# Patient Record
Sex: MALE | Race: WHITE | Employment: FULL TIME | URBAN - NONMETROPOLITAN AREA
[De-identification: names, ages, dates, MRNs, and addresses within clinical notes are randomized per-mention and may not be internally consistent; named-entity substitution may affect disease eponyms.]

---

## 2018-02-16 ENCOUNTER — HOSPITAL ENCOUNTER (OUTPATIENT)
Age: 48
Discharge: HOME | End: 2018-02-16
Payer: COMMERCIAL

## 2018-02-16 ENCOUNTER — HOSPITAL ENCOUNTER (OUTPATIENT)
Dept: NON INVASIVE DIAGNOSTICS | Age: 48
Discharge: HOME | End: 2018-02-16
Payer: COMMERCIAL

## 2018-02-16 LAB
EKG ATRIAL RATE: 101 BPM
EKG P AXIS: 76 DEGREES
EKG P-R INTERVAL: 150 MS
EKG Q-T INTERVAL: 372 MS
EKG QRS DURATION: 110 MS
EKG QTC CALCULATION (BAZETT): 482 MS
EKG R AXIS: 52 DEGREES
EKG T AXIS: 54 DEGREES
EKG VENTRICULAR RATE: 101 BPM

## 2018-02-16 PROCEDURE — 93225 XTRNL ECG REC<48 HRS REC: CPT

## 2018-02-16 PROCEDURE — 93005 ELECTROCARDIOGRAM TRACING: CPT

## 2018-03-19 ENCOUNTER — OFFICE VISIT (OUTPATIENT)
Dept: CARDIOLOGY | Age: 48
End: 2018-03-19
Payer: COMMERCIAL

## 2018-03-19 VITALS
SYSTOLIC BLOOD PRESSURE: 166 MMHG | WEIGHT: 214 LBS | OXYGEN SATURATION: 98 % | RESPIRATION RATE: 16 BRPM | HEART RATE: 96 BPM | BODY MASS INDEX: 28.99 KG/M2 | DIASTOLIC BLOOD PRESSURE: 91 MMHG | HEIGHT: 72 IN

## 2018-03-19 DIAGNOSIS — I49.3 FREQUENT PVCS: Primary | ICD-10-CM

## 2018-03-19 DIAGNOSIS — I10 ESSENTIAL HYPERTENSION: ICD-10-CM

## 2018-03-19 PROCEDURE — 99243 OFF/OP CNSLTJ NEW/EST LOW 30: CPT | Performed by: FAMILY MEDICINE

## 2018-03-19 RX ORDER — OMEPRAZOLE 20 MG/1
20 CAPSULE, DELAYED RELEASE ORAL EVERY OTHER DAY
COMMUNITY
Start: 2018-03-16

## 2018-03-19 RX ORDER — DILTIAZEM HYDROCHLORIDE 120 MG/1
120 CAPSULE, COATED, EXTENDED RELEASE ORAL DAILY
Qty: 90 CAPSULE | Refills: 3 | Status: SHIPPED | OUTPATIENT
Start: 2018-03-19 | End: 2019-02-04 | Stop reason: SDUPTHER

## 2018-03-19 RX ORDER — ALPRAZOLAM 0.25 MG/1
0.25 TABLET ORAL PRN
COMMUNITY
Start: 2018-02-16

## 2018-03-19 RX ORDER — AMLODIPINE BESYLATE 5 MG/1
TABLET ORAL
COMMUNITY
Start: 2018-03-16 | End: 2018-03-19 | Stop reason: ALTCHOICE

## 2018-03-19 RX ORDER — AMOXICILLIN 500 MG
2 CAPSULE ORAL DAILY
COMMUNITY
End: 2019-03-13

## 2018-03-19 NOTE — PROGRESS NOTES
Mihaela Gutierrez CMA am scribing for and in the presence of Dr. Robel Cramer    Patient: Johny Salcido  : 1970  Date of Visit: 2018    REASON FOR VISIT / CONSULTATION: New Patient (Hx: PVC's (06024)LA holter monitor. EKG had been done at the hospital as well. Pt states that he had taken a Zpak and after 3 days into antibiotic he was having palpitations x 6 wks ago(which lasted about 2 hours), he had stopped taking Zpak, but noticed another epiosdes of palpitations about 5 days later (which lasted about an hour). He says the more he thinks about palpitations he think he brings them on his self. Pt states that he exercises on his treadmill every night (30 mins). Denies CP, SOB. ) and Results (PVC's (55643) and isolated PVC's 53897 and 535 ventricular couplets. 9.7% of test duration. Tachycardia 30% of the time. )      Dear Dr. Rohit Kelley,    I had the pleasure of seeing Johny Salcido today. Mr. Deo Alexander is a 50 y.o. male  with a history of an abnormal Holter monitor that had shown frequent PVC's of 12,888 with isolated PVC's of 11,818 and 535 ventricular couplets comprising of 9.7%. His family history consist of his father having a stroke at 58years of age but denies any other family history. He denied any current or recent chest pain, abdominal pain, bleeding problems, problems with his medications or any other concerns at this time. · Palpitation symptoms: Occurring about 1x/month; of moderate intesity; Associated symptoms: none; Exacerbating factors: stress/anxiety; Remediating factors: calming self-down. · Exercise Tolerance: He reports that he has a good exercise tolerance. Mr. Deo Alexander says that he could walk a mile without developing significant chest pain and/or shortness of breath. Past Medical History:   Diagnosis Date    History of Holter monitoring 2018    Very frequent PVC's comprsing 9.7% of total duration. Tachycardia for 30% of the total test duration.  Symproms associated with PVC's and sinus tachycardia. CURRENT ALLERGIES: Patient has no known allergies. REVIEW OF SYSTEMS: 14 systems were reviewed. Pertinent positives and negatives as above, all else negative. No past surgical history on file. Social History:  Social History   Substance Use Topics    Smoking status: Never Smoker    Smokeless tobacco: Never Used    Alcohol use 7.2 oz/week     12 Cans of beer per week        CURRENT MEDICATIONS:  Outpatient Prescriptions Marked as Taking for the 3/19/18 encounter (Office Visit) with Atul Peter MD   Medication Sig Dispense Refill    ALPRAZolam (XANAX) 0.25 MG tablet       amLODIPine (NORVASC) 5 MG tablet       omeprazole (PRILOSEC) 20 MG delayed release capsule       Omega-3 Fatty Acids (FISH OIL) 1200 MG CAPS Take 2 capsules by mouth daily      aspirin 81 MG tablet Take 81 mg by mouth daily      Psyllium (METAMUCIL PO) Take by mouth      Inulin (FIBER CHOICE PO) Take by mouth         FAMILY HISTORY: family history includes Other in his brother; Stroke (age of onset: 58) in his father. PHYSICAL EXAM:   BP (!) 166/97 (Site: Left Arm, Position: Sitting, Cuff Size: Large Adult)   Pulse 94   Resp 16   Ht 6' (1.829 m)   Wt 214 lb (97.1 kg)   SpO2 98%   BMI 29.02 kg/m²  Body mass index is 29.02 kg/m². Constitutional: He is oriented to person, place, and time. He appears well-developed and well-nourished. In no acute distress. HEENT: Normocephalic and atraumatic. No JVD present. Carotid bruit is not present. No mass and no thyromegaly present. No lymphadenopathy present. Cardiovascular: Normal rate, irregularly irregular rhythm, normal heart sounds. Exam reveals no gallop and no friction rubs. No heart murmur heard. Pulmonary/Chest: Effort normal and breath sounds normal. No respiratory distress. He has no wheezes, rhonchi or rales. Abdominal: Soft, non-tender. Bowel sounds and aorta are normal. He exhibits no organomegaly, mass or bruit.

## 2018-03-29 ENCOUNTER — HOSPITAL ENCOUNTER (OUTPATIENT)
Dept: NON INVASIVE DIAGNOSTICS | Age: 48
Discharge: HOME | End: 2018-03-29
Payer: COMMERCIAL

## 2018-03-29 DIAGNOSIS — I10 ESSENTIAL HYPERTENSION: ICD-10-CM

## 2018-03-29 DIAGNOSIS — I49.3 FREQUENT PVCS: ICD-10-CM

## 2018-03-29 LAB
LV EF: 60 %
LVEF MODALITY: NORMAL

## 2018-03-29 PROCEDURE — 93017 CV STRESS TEST TRACING ONLY: CPT

## 2018-03-29 PROCEDURE — 93306 TTE W/DOPPLER COMPLETE: CPT

## 2018-04-02 ENCOUNTER — TELEPHONE (OUTPATIENT)
Dept: CARDIOLOGY | Age: 48
End: 2018-04-02

## 2018-04-17 ENCOUNTER — HOSPITAL ENCOUNTER (OUTPATIENT)
Dept: NON INVASIVE DIAGNOSTICS | Age: 48
Discharge: HOME | End: 2018-04-17
Payer: COMMERCIAL

## 2018-04-17 DIAGNOSIS — I10 ESSENTIAL HYPERTENSION: ICD-10-CM

## 2018-04-17 DIAGNOSIS — I49.3 FREQUENT PVCS: ICD-10-CM

## 2018-04-17 PROCEDURE — 93225 XTRNL ECG REC<48 HRS REC: CPT

## 2018-04-25 ENCOUNTER — TELEPHONE (OUTPATIENT)
Dept: CARDIOLOGY | Age: 48
End: 2018-04-25

## 2019-02-04 RX ORDER — DILTIAZEM HYDROCHLORIDE 120 MG/1
CAPSULE, EXTENDED RELEASE ORAL
Qty: 90 CAPSULE | Refills: 3 | Status: SHIPPED | OUTPATIENT
Start: 2019-02-04 | End: 2019-03-13 | Stop reason: DRUGHIGH

## 2019-03-13 ENCOUNTER — OFFICE VISIT (OUTPATIENT)
Dept: CARDIOLOGY | Age: 49
End: 2019-03-13
Payer: COMMERCIAL

## 2019-03-13 VITALS — HEART RATE: 89 BPM | SYSTOLIC BLOOD PRESSURE: 172 MMHG | DIASTOLIC BLOOD PRESSURE: 112 MMHG | OXYGEN SATURATION: 98 %

## 2019-03-13 DIAGNOSIS — I10 ESSENTIAL HYPERTENSION: ICD-10-CM

## 2019-03-13 DIAGNOSIS — I49.3 PVC'S (PREMATURE VENTRICULAR CONTRACTIONS): Primary | ICD-10-CM

## 2019-03-13 PROCEDURE — 99214 OFFICE O/P EST MOD 30 MIN: CPT | Performed by: FAMILY MEDICINE

## 2019-03-13 PROCEDURE — 93000 ELECTROCARDIOGRAM COMPLETE: CPT | Performed by: FAMILY MEDICINE

## 2019-03-13 RX ORDER — BUSPIRONE HYDROCHLORIDE 10 MG/1
10 TABLET ORAL 2 TIMES DAILY
Status: ON HOLD | COMMUNITY
End: 2019-04-15

## 2019-03-13 RX ORDER — DILTIAZEM HYDROCHLORIDE 180 MG/1
180 CAPSULE, COATED, EXTENDED RELEASE ORAL DAILY
Qty: 90 CAPSULE | Refills: 3 | Status: SHIPPED | OUTPATIENT
Start: 2019-03-13

## 2019-04-08 ENCOUNTER — TELEPHONE (OUTPATIENT)
Dept: GASTROENTEROLOGY | Age: 49
End: 2019-04-08

## 2019-04-08 DIAGNOSIS — Z12.11 COLON CANCER SCREENING: Primary | ICD-10-CM

## 2019-04-09 RX ORDER — SODIUM, POTASSIUM,MAG SULFATES 17.5-3.13G
SOLUTION, RECONSTITUTED, ORAL ORAL
Qty: 2 BOTTLE | Refills: 0 | Status: ON HOLD | OUTPATIENT
Start: 2019-04-09 | End: 2019-04-15 | Stop reason: HOSPADM

## 2019-04-10 NOTE — TELEPHONE ENCOUNTER
Procedure scheduled 4/15/19. Patient notified surgery will call day prior with time.  Order faxed to surgery, prep instructions emailed to patient

## 2019-04-12 PROBLEM — Z12.11 COLON CANCER SCREENING: Status: ACTIVE | Noted: 2019-04-12

## 2019-04-15 ENCOUNTER — ANESTHESIA EVENT (OUTPATIENT)
Dept: OPERATING ROOM | Age: 49
End: 2019-04-15
Payer: COMMERCIAL

## 2019-04-15 ENCOUNTER — HOSPITAL ENCOUNTER (OUTPATIENT)
Age: 49
Setting detail: OUTPATIENT SURGERY
Discharge: HOME | End: 2019-04-15
Attending: INTERNAL MEDICINE | Admitting: INTERNAL MEDICINE
Payer: COMMERCIAL

## 2019-04-15 ENCOUNTER — ANESTHESIA (OUTPATIENT)
Dept: OPERATING ROOM | Age: 49
End: 2019-04-15
Payer: COMMERCIAL

## 2019-04-15 VITALS
OXYGEN SATURATION: 98 % | WEIGHT: 210 LBS | TEMPERATURE: 98.1 F | RESPIRATION RATE: 18 BRPM | HEART RATE: 76 BPM | BODY MASS INDEX: 29.4 KG/M2 | DIASTOLIC BLOOD PRESSURE: 96 MMHG | HEIGHT: 71 IN | SYSTOLIC BLOOD PRESSURE: 158 MMHG

## 2019-04-15 VITALS
DIASTOLIC BLOOD PRESSURE: 42 MMHG | RESPIRATION RATE: 22 BRPM | SYSTOLIC BLOOD PRESSURE: 87 MMHG | OXYGEN SATURATION: 99 %

## 2019-04-15 PROCEDURE — 3609010600 HC COLONOSCOPY POLYPECTOMY SNARE/COLD BIOPSY: Performed by: INTERNAL MEDICINE

## 2019-04-15 PROCEDURE — 88305 TISSUE EXAM BY PATHOLOGIST: CPT

## 2019-04-15 PROCEDURE — 2580000003 HC RX 258: Performed by: INTERNAL MEDICINE

## 2019-04-15 PROCEDURE — 45385 COLONOSCOPY W/LESION REMOVAL: CPT | Performed by: INTERNAL MEDICINE

## 2019-04-15 PROCEDURE — 2500000003 HC RX 250 WO HCPCS: Performed by: NURSE ANESTHETIST, CERTIFIED REGISTERED

## 2019-04-15 PROCEDURE — 7100000011 HC PHASE II RECOVERY - ADDTL 15 MIN: Performed by: INTERNAL MEDICINE

## 2019-04-15 PROCEDURE — 7100000010 HC PHASE II RECOVERY - FIRST 15 MIN: Performed by: INTERNAL MEDICINE

## 2019-04-15 PROCEDURE — 3700000001 HC ADD 15 MINUTES (ANESTHESIA): Performed by: INTERNAL MEDICINE

## 2019-04-15 PROCEDURE — 2709999900 HC NON-CHARGEABLE SUPPLY: Performed by: INTERNAL MEDICINE

## 2019-04-15 PROCEDURE — 6360000002 HC RX W HCPCS: Performed by: NURSE ANESTHETIST, CERTIFIED REGISTERED

## 2019-04-15 PROCEDURE — 3700000000 HC ANESTHESIA ATTENDED CARE: Performed by: INTERNAL MEDICINE

## 2019-04-15 RX ORDER — SODIUM CHLORIDE, SODIUM LACTATE, POTASSIUM CHLORIDE, CALCIUM CHLORIDE 600; 310; 30; 20 MG/100ML; MG/100ML; MG/100ML; MG/100ML
INJECTION, SOLUTION INTRAVENOUS CONTINUOUS
Status: DISCONTINUED | OUTPATIENT
Start: 2019-04-15 | End: 2019-04-15 | Stop reason: HOSPADM

## 2019-04-15 RX ORDER — FENTANYL CITRATE 50 UG/ML
INJECTION, SOLUTION INTRAMUSCULAR; INTRAVENOUS PRN
Status: DISCONTINUED | OUTPATIENT
Start: 2019-04-15 | End: 2019-04-15 | Stop reason: SDUPTHER

## 2019-04-15 RX ORDER — LIDOCAINE HYDROCHLORIDE 20 MG/ML
INJECTION, SOLUTION EPIDURAL; INFILTRATION; INTRACAUDAL; PERINEURAL PRN
Status: DISCONTINUED | OUTPATIENT
Start: 2019-04-15 | End: 2019-04-15 | Stop reason: SDUPTHER

## 2019-04-15 RX ORDER — PROPOFOL 10 MG/ML
INJECTION, EMULSION INTRAVENOUS CONTINUOUS PRN
Status: DISCONTINUED | OUTPATIENT
Start: 2019-04-15 | End: 2019-04-15 | Stop reason: SDUPTHER

## 2019-04-15 RX ORDER — ESMOLOL HYDROCHLORIDE 10 MG/ML
INJECTION INTRAVENOUS PRN
Status: DISCONTINUED | OUTPATIENT
Start: 2019-04-15 | End: 2019-04-15 | Stop reason: SDUPTHER

## 2019-04-15 RX ORDER — PROPOFOL 10 MG/ML
INJECTION, EMULSION INTRAVENOUS PRN
Status: DISCONTINUED | OUTPATIENT
Start: 2019-04-15 | End: 2019-04-15 | Stop reason: SDUPTHER

## 2019-04-15 RX ADMIN — PROPOFOL 40 MG: 10 INJECTION, EMULSION INTRAVENOUS at 11:56

## 2019-04-15 RX ADMIN — LIDOCAINE HYDROCHLORIDE 100 MG: 20 INJECTION, SOLUTION EPIDURAL; INFILTRATION; INTRACAUDAL at 11:53

## 2019-04-15 RX ADMIN — PROPOFOL 40 MG: 10 INJECTION, EMULSION INTRAVENOUS at 11:55

## 2019-04-15 RX ADMIN — ESMOLOL HYDROCHLORIDE 40 MG: 10 INJECTION INTRAVENOUS at 11:50

## 2019-04-15 RX ADMIN — PROPOFOL 180 MCG/KG/MIN: 10 INJECTION, EMULSION INTRAVENOUS at 11:54

## 2019-04-15 RX ADMIN — FENTANYL CITRATE 50 MCG: 50 INJECTION INTRAMUSCULAR; INTRAVENOUS at 12:01

## 2019-04-15 RX ADMIN — PROPOFOL 130 MG: 10 INJECTION, EMULSION INTRAVENOUS at 11:53

## 2019-04-15 RX ADMIN — FENTANYL CITRATE 50 MCG: 50 INJECTION INTRAMUSCULAR; INTRAVENOUS at 11:57

## 2019-04-15 RX ADMIN — SODIUM CHLORIDE, POTASSIUM CHLORIDE, SODIUM LACTATE AND CALCIUM CHLORIDE: 600; 310; 30; 20 INJECTION, SOLUTION INTRAVENOUS at 10:58

## 2019-04-15 RX ADMIN — PROPOFOL 40 MG: 10 INJECTION, EMULSION INTRAVENOUS at 12:01

## 2019-04-15 ASSESSMENT — PAIN SCALES - GENERAL
PAINLEVEL_OUTOF10: 0

## 2019-04-15 ASSESSMENT — PAIN - FUNCTIONAL ASSESSMENT: PAIN_FUNCTIONAL_ASSESSMENT: 0-10

## 2019-04-15 ASSESSMENT — LIFESTYLE VARIABLES: SMOKING_STATUS: 0

## 2019-04-15 NOTE — H&P
History and Physical    Patient's Name/Date of Birth: Paloma Blake / 1970 (36 y.o.)    Date: April 15, 2019     CHIEF COMPLAINT:  screening for colon cancer      Past Medical History:   Diagnosis Date    Anxiety     GERD (gastroesophageal reflux disease)     History of echocardiogram 03/29/2018    Frequent PVC's throughout the procedure. Unremarkable study.  History of Holter monitoring 02/22/2018    Very frequent PVC's comprsing 9.7% of total duration. Tachycardia for 30% of the total test duration. Symproms associated with PVC's and sinus tachycardia.  History of Holter monitoring 04/21/2018    Sinus rhythm w/ frequent PVC's (PVC burdern 2%), mainly isolated w/ multiple couplets. No ventricular runs.  Hypertension      Past Surgical History:   Procedure Laterality Date    ANUS SURGERY      TONSILLECTOMY       Current Facility-Administered Medications   Medication Dose Route Frequency Provider Last Rate Last Dose    lactated ringers infusion   Intravenous Continuous Junior Rory  mL/hr at 04/15/19 1058       No Known Allergies  Family History   Problem Relation Age of Onset    Stroke Father 58    Other Brother         cholestrol     Social History     Socioeconomic History    Marital status:      Spouse name: Not on file    Number of children: Not on file    Years of education: Not on file    Highest education level: Not on file   Occupational History    Not on file   Social Needs    Financial resource strain: Not on file    Food insecurity:     Worry: Not on file     Inability: Not on file    Transportation needs:     Medical: Not on file     Non-medical: Not on file   Tobacco Use    Smoking status: Never Smoker    Smokeless tobacco: Never Used   Substance and Sexual Activity    Alcohol use: Yes     Alcohol/week: 7.2 oz     Types: 12 Cans of beer per week     Comment: last drank 4/12/19    Drug use: Yes     Types: Marijuana     Comment: once in a while.  last

## 2019-04-15 NOTE — PROGRESS NOTES
Dr. Desmond Marlow spoke with pt. Discharge instructions given to pt and visitor. Discharge Criteria    Inpatients must meet Criteria 1 through 7. All other patients are either YES or N/A. If a NO is chosen then Anesthesia or Surgeon must be notified. 1.  Minimum 30 minutes after last dose of sedative medication, minimum 120 minutes after last dose of reversal agent. Yes      2. Systolic BP stable within 20 mmHg for 30 minutes & systolic BP between 90 & 308 or within 10 mmHg of baseline. Yes      3. Pulse between 60 and 100 or within 10 bpm of baseline. Yes      4. Spontaneous respiratory rate >/= 10 per minute. Yes      5. SaO2 >/= 95 or  >/= baseline. Yes      6. Able to cough and swallow or return to baseline function. Yes      7. Alert and oriented or return to baseline mental status. Yes      8. Demonstrates controlled, coordinated movements, ambulates with steady gait, or return to baseline activity function. Yes      9. Minimal or no pain or nausea, or at a level tolerable and acceptable to patient. Yes      10. Takes and retains oral fluids as allowed. Yes      11. Procedural / perioperative site stable. Minimal or no bleeding. Yes          12. If GI endoscopy procedure, minimal or no abdominal distention or passing flatus. Yes      13. Written discharge instructions and emergency telephone number provided. Yes      14. Accompanied by a responsible adult. Yes      Adult patient discharged from facility without responsible person meets above criteria plus the following:   a) remains awake without stimulus for 30 minutes     b) oriented appropriate for age      c) all vital signs stable   d) no significant risk of losing protective reflexes      e) able to maintain pre-procedure mobility without assistance   f) no nausea or dizziness      g) transportation arrangements that do not require patient to operate motor Vehicle.      N/A

## 2019-04-15 NOTE — ANESTHESIA POSTPROCEDURE EVALUATION
Department of Anesthesiology  Postprocedure Note    Patient: Estefani Miller  MRN: [id number]  YOB: 1970  Date of evaluation: 4/15/2019  Time:  12:49 PM     Procedure Summary     Date:  04/15/19 [facility]    Anesthesia Start:  7697 Anesthesia Stop:  1218    Procedure:  COLONOSCOPY POLYPECTOMY SNARE/COLD BIOPSY  cold snare (N/A ) Diagnosis:  (COLON CANCER SCREENING)    Surgeon:  Brittanie Holliday MD Responsible Provider:  MARISA Chandra CRNA    Anesthesia Type:  general, TIVA ASA Status:  2          Anesthesia Type: general, TIVA    Tuan Phase I:      Tuan Phase II:      Last vitals: Reviewed and per EMR flowsheets.        Anesthesia Post Evaluation    Patient location during evaluation: PACU  Patient participation: complete - patient participated  Level of consciousness: awake and alert  Pain score: 0  Airway patency: patent  Nausea & Vomiting: no nausea and no vomiting  Complications: no  Cardiovascular status: hemodynamically stable  Respiratory status: acceptable and spontaneous ventilation  Hydration status: stable

## 2019-04-15 NOTE — ANESTHESIA PRE PROCEDURE
Department of Anesthesiology  Preprocedure Note       Name:  Kathy Orosco   Age:  46 y.o.  :  1970                                          MRN:  [id number]         Date:  4/15/2019      Surgeon: Kem Boss):  Truong Snider MD    Procedure: COLORECTAL CANCER SCREENING, NOT HIGH RISK (N/A )    Medications prior to admission:   Prior to Admission medications    Medication Sig Start Date End Date Taking? Authorizing Provider   Na Sulfate-K Sulfate-Mg Sulf (SUPREP BOWEL PREP KIT) 17.5-3.13-1.6 GM/177ML SOLN Take as directed 19  Yes Truong Snider MD   diltiazem (CARDIZEM CD) 180 MG extended release capsule Take 1 capsule by mouth daily 3/13/19  Yes Rosi Ramírez MD   aspirin 81 MG tablet Take 81 mg by mouth daily   Yes Historical Provider, MD   Multiple Vitamins-Minerals (MULTIVITAMIN ADULT PO) Take by mouth daily    Historical Provider, MD   ALPRAZolam Alexa Itz) 0.25 MG tablet Take 0.25 mg by mouth as needed. 18   Historical Provider, MD   omeprazole (PRILOSEC) 20 MG delayed release capsule Take 20 mg by mouth every other day  3/16/18   Historical Provider, MD   Psyllium (METAMUCIL PO) Take by mouth    Historical Provider, MD       Current medications:    Current Facility-Administered Medications   Medication Dose Route Frequency Provider Last Rate Last Dose    lactated ringers infusion   Intravenous Continuous Truong Snider  mL/hr at 04/15/19 1058         Allergies:  No Known Allergies    Problem List:    Patient Active Problem List   Diagnosis Code    Frequent PVCs I49.3    Essential hypertension I10    Colon cancer screening Z12.11       Past Medical History:        Diagnosis Date    Anxiety     GERD (gastroesophageal reflux disease)     History of echocardiogram 2018    Frequent PVC's throughout the procedure. Unremarkable study.  History of Holter monitoring 2018    Very frequent PVC's comprsing 9.7% of total duration.  Tachycardia for 30% of the total test duration. Symproms associated with PVC's and sinus tachycardia.  History of Holter monitoring 04/21/2018    Sinus rhythm w/ frequent PVC's (PVC burdern 2%), mainly isolated w/ multiple couplets. No ventricular runs.  Hypertension        Past Surgical History:        Procedure Laterality Date    ANUS SURGERY      TONSILLECTOMY         Social History:    Social History     Tobacco Use    Smoking status: Never Smoker    Smokeless tobacco: Never Used   Substance Use Topics    Alcohol use: Yes     Alcohol/week: 7.2 oz     Types: 12 Cans of beer per week     Comment: last drank 4/12/19                                Counseling given: Not Answered      Vital Signs (Current):   Vitals:    04/15/19 1042 04/15/19 1051   BP:  (!) 181/119   Pulse:  120   Resp:  16   Temp:  36.6 °C (97.8 °F)   TempSrc:  Temporal   SpO2:  98%   Weight: 210 lb (95.3 kg)    Height: 5' 11\" (1.803 m)                                               BP Readings from Last 3 Encounters:   04/15/19 (!) 181/119   03/13/19 (!) 172/112   03/19/18 (!) 166/91       NPO Status: Time of last liquid consumption: 0530                        Time of last solid consumption: 0800                        Date of last liquid consumption: 04/15/19                        Date of last solid food consumption: 04/14/19    BMI:   Wt Readings from Last 3 Encounters:   04/15/19 210 lb (95.3 kg)   03/19/18 214 lb (97.1 kg)     Body mass index is 29.29 kg/m².     CBC: No results found for: WBC, RBC, HGB, HCT, MCV, RDW, PLT    CMP:   Lab Results   Component Value Date     10/10/2014    K 4.5 10/10/2014     10/10/2014    CO2 27 10/10/2014    BUN 24 10/10/2014    CREATININE 1.05 10/10/2014    GFRAA >60 10/10/2014    LABGLOM >60 10/10/2014    GLUCOSE 90 10/10/2014    GLUCOSE 103 03/24/2012    PROT 7.2 10/10/2014    CALCIUM 9.5 10/10/2014    BILITOT 0.64 10/10/2014    ALKPHOS 69 10/10/2014    AST 26 10/10/2014    ALT 45 10/10/2014       POC Tests: No results for input(s): POCGLU, POCNA, POCK, POCCL, POCBUN, POCHEMO, POCHCT in the last 72 hours. Coags: No results found for: PROTIME, INR, APTT    HCG (If Applicable): No results found for: PREGTESTUR, PREGSERUM, HCG, HCGQUANT     ABGs: No results found for: PHART, PO2ART, GBN1KAZ, AAG0BWS, BEART, P8ZRZEGM     Type & Screen (If Applicable):  No results found for: LABABO, [address]    Anesthesia Evaluation  Patient summary reviewed and Nursing notes reviewed no history of anesthetic complications:   Airway: Mallampati: II  TM distance: >3 FB   Neck ROM: full  Mouth opening: > = 3 FB Dental: normal exam         Pulmonary:Negative Pulmonary ROS breath sounds clear to auscultation      (-) not a current smoker                           Cardiovascular:  Exercise tolerance: good (>4 METS),   (+) hypertension:,         Rhythm: regular  Rate: abnormal           Beta Blocker:  Not on Beta Blocker         Neuro/Psych:   (+) depression/anxiety              ROS comment: Low back pain  GI/Hepatic/Renal:   (+) GERD: well controlled, bowel prep,           Endo/Other: Negative Endo/Other ROS                    Abdominal:           Vascular: negative vascular ROS. Anesthesia Plan      general and TIVA     ASA 2       Induction: intravenous. Anesthetic plan and risks discussed with patient. Plan discussed with CRNA.                   MARISA Mitchell - NATI   4/15/2019

## 2019-04-17 LAB — SURGICAL PATHOLOGY REPORT: NORMAL

## 2019-05-12 PROBLEM — Z12.11 COLON CANCER SCREENING: Status: RESOLVED | Noted: 2019-04-12 | Resolved: 2019-05-12

## 2022-01-26 ENCOUNTER — HOSPITAL ENCOUNTER (OUTPATIENT)
Dept: LAB | Age: 52
Setting detail: SPECIMEN
Discharge: HOME | End: 2022-01-26
Payer: COMMERCIAL

## 2022-01-26 PROCEDURE — C9803 HOPD COVID-19 SPEC COLLECT: HCPCS

## 2022-01-26 PROCEDURE — U0003 INFECTIOUS AGENT DETECTION BY NUCLEIC ACID (DNA OR RNA); SEVERE ACUTE RESPIRATORY SYNDROME CORONAVIRUS 2 (SARS-COV-2) (CORONAVIRUS DISEASE [COVID-19]), AMPLIFIED PROBE TECHNIQUE, MAKING USE OF HIGH THROUGHPUT TECHNOLOGIES AS DESCRIBED BY CMS-2020-01-R: HCPCS

## 2022-01-26 PROCEDURE — U0005 INFEC AGEN DETEC AMPLI PROBE: HCPCS

## 2022-01-27 LAB
SARS-COV-2: NORMAL
SARS-COV-2: NOT DETECTED
SOURCE: NORMAL

## 2022-09-26 ENCOUNTER — HOSPITAL ENCOUNTER (OUTPATIENT)
Dept: SLEEP CENTER | Age: 52
Discharge: HOME | End: 2022-09-26
Payer: COMMERCIAL

## 2022-09-26 VITALS — BODY MASS INDEX: 30.1 KG/M2 | HEIGHT: 71 IN | WEIGHT: 215 LBS

## 2022-09-26 PROCEDURE — 95806 SLEEP STUDY UNATT&RESP EFFT: CPT

## 2022-09-26 RX ORDER — METOPROLOL SUCCINATE 25 MG/1
25 TABLET, EXTENDED RELEASE ORAL DAILY
COMMUNITY

## 2022-09-26 ASSESSMENT — SLEEP AND FATIGUE QUESTIONNAIRES
I SLEEP WELL: NO
NUMBER OF TIMES YOU WAKE PER NIGHT: 1
SNORING VOLUME: LOUDER THAN TALKING
HOW LIKELY ARE YOU TO NOD OFF OR FALL ASLEEP WHILE SITTING AND TALKING TO SOMEONE: 0
ARE YOU TIRED DURING WAKE TIME: ALMOST DAILY
FUNCTION BEST IN: MORNING
HOW OFTEN DO YOU SNORE: ALMOST DAILY
HOW OFTEN HAVE YOU NODDED OFF OR FALLEN ASLEEP WHILE DRIVING: 1-2 TIMES A MONTH
USUAL AMOUNT OF TIME TO FALL ASLEEP (MIN): 15
WHAT TIME DO YOU USUALLY WAKE UP: 21600
DO YOU HAVE HIGH BLOOD PRESSURE: YES
HOW LIKELY ARE YOU TO NOD OFF OR FALL ASLEEP WHILE SITTING INACTIVE IN A PUBLIC PLACE: 0
WHAT TIME DO YOU USUALLY GO TO BED: 79200
ESS TOTAL SCORE: 8
HOW LIKELY ARE YOU TO NOD OFF OR FALL ASLEEP WHILE LYING DOWN TO REST IN THE AFTERNOON WHEN CIRCUMSTANCES PERMIT: 3
HOW LIKELY ARE YOU TO NOD OFF OR FALL ASLEEP WHILE WATCHING TV: 1
HAVE YOU EVER NODDED OFF OR FALLEN ASLEEP WHILE DRIVING: YES
HOW MANY NAPS DO YOU TAKE PER WEEK: 2
ARE YOU TIRED AFTER SLEEPING: ALMOST DAILY
HOW LIKELY ARE YOU TO NOD OFF OR FALL ASLEEP WHEN YOU ARE A PASSENGER IN A CAR FOR AN HOUR WITHOUT A BREAK: 0
NORMAL AMOUNT OF SLEEP PER NIGHT: 7
HOW LIKELY ARE YOU TO NOD OFF OR FALL ASLEEP WHILE SITTING AND READING: 1
HOW LIKELY ARE YOU TO NOD OFF OR FALL ASLEEP WHILE SITTING QUIETLY AFTER LUNCH WITHOUT ALCOHOL: 3
HAS ANYONE NOTICED THAT YOU QUIT BREATHING DURING SLEEP: NEVER/ALMOST NEVER
HOW LIKELY ARE YOU TO NOD OFF OR FALL ASLEEP IN A CAR, WHILE STOPPED FOR A FEW MINUTES IN TRAFFIC: 0
DO YOU SNORE: YES
FOR THE FIRST 30 MINUTES AFTER WAKING, I AM: SLIGHTLY DROWSY
DOES YOUR SNORING BOTHER OTHERS: YES

## 2022-09-26 NOTE — PROGRESS NOTES
Patient arrived for instruct/issue of Home Sleep Study Unit #3.   Wife will return to ER registration [email or web address]

## 2022-09-28 LAB — STATUS: NORMAL

## 2022-10-03 NOTE — PROGRESS NOTES
Pt chose [facility] for APAP machine and supplies. All info faxed to [facility].    [phone]

## 2022-12-30 ENCOUNTER — HOSPITAL ENCOUNTER (EMERGENCY)
Age: 52
Discharge: HOME | End: 2022-12-30
Payer: COMMERCIAL

## 2022-12-30 VITALS
OXYGEN SATURATION: 99 % | HEART RATE: 64 BPM | DIASTOLIC BLOOD PRESSURE: 67 MMHG | HEIGHT: 71 IN | TEMPERATURE: 97.3 F | SYSTOLIC BLOOD PRESSURE: 147 MMHG | WEIGHT: 225 LBS | BODY MASS INDEX: 31.5 KG/M2 | RESPIRATION RATE: 16 BRPM

## 2022-12-30 DIAGNOSIS — S61.212A LACERATION OF RIGHT MIDDLE FINGER WITHOUT DAMAGE TO NAIL, FOREIGN BODY PRESENCE UNSPECIFIED, INITIAL ENCOUNTER: Primary | ICD-10-CM

## 2022-12-30 PROCEDURE — 90471 IMMUNIZATION ADMIN: CPT | Performed by: PHYSICIAN ASSISTANT

## 2022-12-30 PROCEDURE — 99284 EMERGENCY DEPT VISIT MOD MDM: CPT

## 2022-12-30 PROCEDURE — 12001 RPR S/N/AX/GEN/TRNK 2.5CM/<: CPT

## 2022-12-30 PROCEDURE — 90715 TDAP VACCINE 7 YRS/> IM: CPT | Performed by: PHYSICIAN ASSISTANT

## 2022-12-30 PROCEDURE — 2500000003 HC RX 250 WO HCPCS: Performed by: PHYSICIAN ASSISTANT

## 2022-12-30 PROCEDURE — 6360000002 HC RX W HCPCS: Performed by: PHYSICIAN ASSISTANT

## 2022-12-30 RX ORDER — LIDOCAINE HYDROCHLORIDE 10 MG/ML
5 INJECTION, SOLUTION EPIDURAL; INFILTRATION; INTRACAUDAL; PERINEURAL ONCE
Status: COMPLETED | OUTPATIENT
Start: 2022-12-30 | End: 2022-12-30

## 2022-12-30 RX ORDER — CEPHALEXIN 500 MG/1
500 CAPSULE ORAL 2 TIMES DAILY
Qty: 14 CAPSULE | Refills: 0 | Status: SHIPPED | OUTPATIENT
Start: 2022-12-30 | End: 2023-01-06

## 2022-12-30 RX ORDER — BACITRACIN ZINC 500 [USP'U]/G
OINTMENT TOPICAL 2 TIMES DAILY
Status: DISCONTINUED | OUTPATIENT
Start: 2022-12-30 | End: 2022-12-30 | Stop reason: HOSPADM

## 2022-12-30 RX ADMIN — TETANUS TOXOID, REDUCED DIPHTHERIA TOXOID AND ACELLULAR PERTUSSIS VACCINE, ADSORBED 0.5 ML: 5; 2.5; 8; 8; 2.5 SUSPENSION INTRAMUSCULAR at 14:23

## 2022-12-30 RX ADMIN — LIDOCAINE HYDROCHLORIDE 5 ML: 10 INJECTION, SOLUTION EPIDURAL; INFILTRATION; INTRACAUDAL; PERINEURAL at 14:23

## 2022-12-30 ASSESSMENT — PAIN - FUNCTIONAL ASSESSMENT
PAIN_FUNCTIONAL_ASSESSMENT: 0-10
PAIN_FUNCTIONAL_ASSESSMENT: NONE - DENIES PAIN

## 2022-12-30 ASSESSMENT — ENCOUNTER SYMPTOMS
ROS SKIN COMMENTS: SEE HPI
RESPIRATORY NEGATIVE: 1

## 2022-12-30 ASSESSMENT — LIFESTYLE VARIABLES: HOW OFTEN DO YOU HAVE A DRINK CONTAINING ALCOHOL: NEVER

## 2022-12-30 ASSESSMENT — PAIN SCALES - GENERAL: PAINLEVEL_OUTOF10: 0

## 2022-12-30 NOTE — DISCHARGE INSTRUCTIONS
Follow-up with primary care doctor 10 days for reevaluation and suture removal.  Take antibiotic as directed. Take Tylenol for pain as directed.   Promptly return to emergency department for new, changing, worsening of symptoms or other concerns

## 2022-12-30 NOTE — ED PROVIDER NOTES
[facility]  EMERGENCY DEPARTMENT ENCOUNTER      Pt Name: Yvette Rojas  MRN: [id number]  [place] 1970  Date of evaluation: 12/30/2022  Provider: Sumanth Alexander Dr       Chief Complaint   Patient presents with    Laceration     Laceration to right 3rd finger, cut on pocket knife. HISTORY OF PRESENT ILLNESS   (Location/Symptom, Timing/Onset, Context/Setting, Quality, Duration, Modifying Factors, Severity)  Note limiting factors. Yvette Rojas is a 45 y.o. male who presents to the emergency department for evaluation of right middle finger laceration as patient states he was working on some fishing poles in the garage with a pocket knife and accidentally cut his right middle finger with a pocket knife at approximately 1 PM.  Patient denies foreign body sensation range of motion deficit other pain sites or complaints. Patient reports his tetanus is not up-to-date. No other symptoms noted, patient has no additional complaints at present. HPI    Nursing Notes were reviewed. REVIEW OF SYSTEMS    (2-9 systems for level 4, 10 or more for level 5)     Review of Systems   Constitutional: Negative. Respiratory: Negative. Cardiovascular: Negative. Musculoskeletal: Negative. Skin:         See hpi   Psychiatric/Behavioral: Negative. Except as noted above the remainder of the review of systems was reviewed and negative. PAST MEDICAL HISTORY     Past Medical History:   Diagnosis Date    Anxiety     GERD (gastroesophageal reflux disease)     History of echocardiogram 03/29/2018    Frequent PVC's throughout the procedure. Unremarkable study. History of Holter monitoring 02/22/2018    Very frequent PVC's comprsing 9.7% of total duration. Tachycardia for 30% of the total test duration. Symproms associated with PVC's and sinus tachycardia.      History of Holter monitoring 04/21/2018    Sinus rhythm w/ frequent PVC's (PVC burdern 2%), mainly isolated w/ multiple couplets. No ventricular runs. Hypertension          SURGICAL HISTORY       Past Surgical History:   Procedure Laterality Date    ANUS SURGERY      COLONOSCOPY N/A 4/15/2019    -polyp(tubular adenoma)hermorrhoids    TONSILLECTOMY           CURRENT MEDICATIONS       Discharge Medication List as of 12/30/2022  2:42 PM        CONTINUE these medications which have NOT CHANGED    Details   metoprolol succinate (TOPROL XL) 25 MG extended release tablet Take 25 mg by mouth dailyHistorical Med      Multiple Vitamins-Minerals (MULTIVITAMIN ADULT PO) Take by mouth dailyHistorical Med      diltiazem (CARDIZEM CD) 180 MG extended release capsule Take 1 capsule by mouth daily, Disp-90 capsule, R-3Normal      ALPRAZolam (XANAX) 0.25 MG tablet Take 0.25 mg by mouth as needed. Historical Med      omeprazole (PRILOSEC) 20 MG delayed release capsule Take 20 mg by mouth every other day Historical Med      aspirin 81 MG tablet Take 81 mg by mouth dailyHistorical Med      Psyllium (METAMUCIL PO) Take by mouthHistorical Med             ALLERGIES     Patient has no known allergies. FAMILY HISTORY       Family History   Problem Relation Age of Onset    Stroke Father 58    Other Brother         cholestrol          SOCIAL HISTORY       Social History     Socioeconomic History    Marital status:      Spouse name: None    Number of children: None    Years of education: None    Highest education level: None   Tobacco Use    Smoking status: Never    Smokeless tobacco: Never   Vaping Use    Vaping Use: Never used   Substance and Sexual Activity    Alcohol use: Yes     Alcohol/week: 12.0 standard drinks     Types: 12 Cans of beer per week     Comment: last drank 4/12/19    Drug use: Yes     Types: Marijuana (Weed)     Comment: once in a while.  last smoked 7 days ago       SCREENINGS         [place] Coma Scale  Eye Opening: Spontaneous  Best Verbal Response: Oriented  Best Motor Response: Obeys commands  [place] Coma Scale Score: 15                     WA Assessment  BP: (!) 147/67  Heart Rate: 64                 PHYSICAL EXAM    (up to 7 for level 4, 8 or more for level 5)     ED Triage Vitals   BP Temp Temp Source Heart Rate Resp SpO2 Height Weight   12/30/22 1334 12/30/22 1340 12/30/22 1340 12/30/22 1326 12/30/22 1326 12/30/22 1326 12/30/22 1326 12/30/22 1326   (!) 147/67 97.3 °F (36.3 °C) Tympanic 64 16 99 % 5' 11\" (1.803 m) 225 lb (102.1 kg)       Physical Exam  Vitals and nursing note reviewed. Constitutional:       Appearance: Normal appearance. HENT:      Head: Normocephalic and atraumatic. Eyes:      Extraocular Movements: Extraocular movements intact. Cardiovascular:      Rate and Rhythm: Normal rate and regular rhythm. Pulmonary:      Effort: Pulmonary effort is normal.      Breath sounds: Normal breath sounds. Musculoskeletal:         General: Normal range of motion. Hands:       Cervical back: Normal range of motion. Comments: 1.2 cm superficial laceration distal aspect right finger see right upper palmar side diagram of hand distally neurovascular intact full AROM appreciated   Skin:     General: Skin is warm and dry. Capillary Refill: Capillary refill takes less than 2 seconds. Neurological:      General: No focal deficit present. Mental Status: He is alert and oriented to person, place, and time. Mental status is at baseline. Psychiatric:         Mood and Affect: Mood normal.         Behavior: Behavior normal.       DIAGNOSTIC RESULTS       Interpretation per the Radiologist below, if available at the time of this note:    No orders to display         ED BEDSIDE ULTRASOUND:   Performed by ED Physician - none    LABS:  Labs Reviewed - No data to display    All other labs were within normal range or not returned as of this dictation.     EMERGENCY DEPARTMENT COURSE and DIFFERENTIAL DIAGNOSIS/MDM:   Vitals:    Vitals:    12/30/22 1326 12/30/22 1334 12/30/22 1340   BP:  (!) 147/67    Pulse: 64     Resp: 16     Temp:   97.3 °F (36.3 °C)   TempSrc:   Tympanic   SpO2: 99%     Weight: 225 lb (102.1 kg)     Height: 5' 11\" (1.803 m)             MDM    49-year-old nontoxic-appearing male presents emergency department for evaluation of laceration happening earlier today. The plan is to perform primary closure here in the emergency department and update patient's tetanus today. REASSESSMENT      Patient had uncomplicated ER course. CRITICAL CARE TIME   Total Critical Care time was  minutes, excluding separately reportable procedures. There was a high probability of clinically significant/life threatening deterioration in the patient's condition which required my urgent intervention.      CONSULTS:  None    PROCEDURES:  Unless otherwise noted below, none     Lac Repair    Date/Time: 12/30/2022 2:47 PM  Performed by: Jerry Herrera PA-C  Authorized by: Jerry Herrera PA-C     Consent:     Consent obtained:  Verbal    Consent given by:  Patient    Risks discussed:  Infection and pain  Universal protocol:     Imaging studies available: no      Patient identity confirmed:  Verbally with patient and arm band  Laceration details:     Location:  Finger    Finger location:  R long finger    Length (cm):  1.2    Depth (mm):  3  Pre-procedure details:     Preparation:  Patient was prepped and draped in usual sterile fashion  Exploration:     Limited defect created (wound extended): no      Imaging outcome: foreign body not noted      Wound exploration: wound explored through full range of motion and entire depth of wound visualized      Wound extent: no tendon damage noted      Contaminated: no    Treatment:     Area cleansed with:  Povidone-iodine and saline    Amount of cleaning:  Standard    Irrigation solution:  Sterile saline    Irrigation volume:  300    Visualized foreign bodies/material removed: no      Debridement:  None    Undermining:  None  Skin repair:     Repair method:  Sutures    Suture size:  4-0    Suture technique:  Simple interrupted    Number of sutures:  4  Approximation:     Approximation:  Close  Repair type:     Repair type:  Simple  Post-procedure details:     Dressing:  Antibiotic ointment and non-adherent dressing    Procedure completion:  Tolerated well, no immediate complications  Comments:      Good hemostasis with good soft tissue approximation        FINAL IMPRESSION      1. Laceration of right middle finger without damage to nail, foreign body presence unspecified, initial encounter Stable         DISPOSITION/PLAN   DISPOSITION Decision To Discharge 12/30/2022 02:37:53 PM      PATIENT REFERRED TO:  Hiram Singh, [facility]    Schedule an appointment as soon as possible for a visit   If symptoms worsen    DISCHARGE MEDICATIONS:  Discharge Medication List as of 12/30/2022  2:42 PM        START taking these medications    Details   cephALEXin (KEFLEX) 500 MG capsule Take 1 capsule by mouth 2 times daily for 7 days, Disp-14 capsule, R-0Normal           Controlled Substances Monitoring:     No flowsheet data found.     (Please note that portions of this note were completed with a voice recognition program.  Efforts were made to edit the dictations but occasionally words are mis-transcribed.)    Sherlyn Quesada PA-C (electronically signed)  Attending Emergency Physician           Sherlyn Quesada PA-C  12/30/22 [id number]

## 2024-02-14 ENCOUNTER — TELEPHONE (OUTPATIENT)
Dept: SURGERY | Age: 54
End: 2024-02-14

## 2024-04-29 ENCOUNTER — HOSPITAL ENCOUNTER (OUTPATIENT)
Age: 54
Discharge: HOME | End: 2024-04-29
Payer: COMMERCIAL

## 2024-04-29 DIAGNOSIS — Z01.818 PREOP TESTING: ICD-10-CM

## 2024-04-29 PROCEDURE — 93005 ELECTROCARDIOGRAM TRACING: CPT | Performed by: SURGERY

## 2024-04-29 NOTE — PROGRESS NOTES
Patient states they received their colon prep instructions and home medications that are to be taken on the day of their procedure with a small sip of water only, from the physician's office. Instructed pt to take metoprolol, cardizem, and prilosec with a small sip of water prior to arriving to the hosptial the day of surgery. Required pre-op EKG not ordered by the office. PAT RN placed EKG order in [facility] and patient will complete later today 4/29/24.

## 2024-04-30 LAB
EKG ATRIAL RATE: 73 BPM
EKG P AXIS: 69 DEGREES
EKG P-R INTERVAL: 184 MS
EKG Q-T INTERVAL: 408 MS
EKG QRS DURATION: 110 MS
EKG QTC CALCULATION (BAZETT): 449 MS
EKG R AXIS: 40 DEGREES
EKG T AXIS: 41 DEGREES
EKG VENTRICULAR RATE: 73 BPM

## 2024-04-30 PROCEDURE — 93010 ELECTROCARDIOGRAM REPORT: CPT | Performed by: FAMILY MEDICINE

## 2024-05-01 ENCOUNTER — ANESTHESIA EVENT (OUTPATIENT)
Dept: OPERATING ROOM | Age: 54
End: 2024-05-01
Payer: COMMERCIAL

## 2024-05-01 ENCOUNTER — TELEPHONE (OUTPATIENT)
Dept: PREADMISSION TESTING | Age: 54
End: 2024-05-01

## 2024-05-01 NOTE — TELEPHONE ENCOUNTER
EKG and chart reviewed. EKG unchanged since 2018 at which time he also had an ECHO for frequent PVC's that was unremarkable. Okay to proceed with anesthesia for colonoscopy.

## 2024-05-03 ENCOUNTER — ANESTHESIA (OUTPATIENT)
Dept: OPERATING ROOM | Age: 54
End: 2024-05-03
Payer: COMMERCIAL

## 2024-05-03 ENCOUNTER — HOSPITAL ENCOUNTER (OUTPATIENT)
Age: 54
Setting detail: OUTPATIENT SURGERY
Discharge: HOME | End: 2024-05-03
Attending: SURGERY | Admitting: SURGERY
Payer: COMMERCIAL

## 2024-05-03 VITALS
SYSTOLIC BLOOD PRESSURE: 148 MMHG | WEIGHT: 225 LBS | TEMPERATURE: 96.9 F | OXYGEN SATURATION: 98 % | DIASTOLIC BLOOD PRESSURE: 87 MMHG | BODY MASS INDEX: 30.48 KG/M2 | HEIGHT: 72 IN | RESPIRATION RATE: 16 BRPM | HEART RATE: 65 BPM

## 2024-05-03 DIAGNOSIS — Z01.818 PREOP TESTING: Primary | ICD-10-CM

## 2024-05-03 DIAGNOSIS — Z12.11 SCREEN FOR COLON CANCER: ICD-10-CM

## 2024-05-03 PROCEDURE — 3609010600 HC COLONOSCOPY POLYPECTOMY SNARE/COLD BIOPSY: Performed by: SURGERY

## 2024-05-03 PROCEDURE — 2580000003 HC RX 258: Performed by: NURSE ANESTHETIST, CERTIFIED REGISTERED

## 2024-05-03 PROCEDURE — 7100000011 HC PHASE II RECOVERY - ADDTL 15 MIN: Performed by: SURGERY

## 2024-05-03 PROCEDURE — 7100000010 HC PHASE II RECOVERY - FIRST 15 MIN: Performed by: SURGERY

## 2024-05-03 PROCEDURE — 45380 COLONOSCOPY AND BIOPSY: CPT | Performed by: SURGERY

## 2024-05-03 PROCEDURE — 6360000002 HC RX W HCPCS: Performed by: NURSE ANESTHETIST, CERTIFIED REGISTERED

## 2024-05-03 PROCEDURE — 2709999900 HC NON-CHARGEABLE SUPPLY: Performed by: SURGERY

## 2024-05-03 PROCEDURE — 3700000000 HC ANESTHESIA ATTENDED CARE: Performed by: SURGERY

## 2024-05-03 PROCEDURE — 2500000003 HC RX 250 WO HCPCS: Performed by: NURSE ANESTHETIST, CERTIFIED REGISTERED

## 2024-05-03 PROCEDURE — 3700000001 HC ADD 15 MINUTES (ANESTHESIA): Performed by: SURGERY

## 2024-05-03 PROCEDURE — 88305 TISSUE EXAM BY PATHOLOGIST: CPT

## 2024-05-03 RX ORDER — SODIUM CHLORIDE 0.9 % (FLUSH) 0.9 %
5-40 SYRINGE (ML) INJECTION EVERY 12 HOURS SCHEDULED
Status: DISCONTINUED | OUTPATIENT
Start: 2024-05-03 | End: 2024-05-03 | Stop reason: HOSPADM

## 2024-05-03 RX ORDER — SODIUM CHLORIDE 0.9 % (FLUSH) 0.9 %
5-40 SYRINGE (ML) INJECTION PRN
Status: DISCONTINUED | OUTPATIENT
Start: 2024-05-03 | End: 2024-05-03 | Stop reason: HOSPADM

## 2024-05-03 RX ORDER — SODIUM CHLORIDE, SODIUM LACTATE, POTASSIUM CHLORIDE, CALCIUM CHLORIDE 600; 310; 30; 20 MG/100ML; MG/100ML; MG/100ML; MG/100ML
INJECTION, SOLUTION INTRAVENOUS CONTINUOUS PRN
Status: DISCONTINUED | OUTPATIENT
Start: 2024-05-03 | End: 2024-05-03 | Stop reason: SDUPTHER

## 2024-05-03 RX ORDER — PROPOFOL 10 MG/ML
INJECTION, EMULSION INTRAVENOUS PRN
Status: DISCONTINUED | OUTPATIENT
Start: 2024-05-03 | End: 2024-05-03 | Stop reason: SDUPTHER

## 2024-05-03 RX ORDER — LIDOCAINE HYDROCHLORIDE 20 MG/ML
INJECTION, SOLUTION INFILTRATION; PERINEURAL PRN
Status: DISCONTINUED | OUTPATIENT
Start: 2024-05-03 | End: 2024-05-03 | Stop reason: SDUPTHER

## 2024-05-03 RX ORDER — NALOXONE HYDROCHLORIDE 0.4 MG/ML
INJECTION, SOLUTION INTRAMUSCULAR; INTRAVENOUS; SUBCUTANEOUS PRN
Status: DISCONTINUED | OUTPATIENT
Start: 2024-05-03 | End: 2024-05-03 | Stop reason: HOSPADM

## 2024-05-03 RX ORDER — SODIUM CHLORIDE 9 MG/ML
INJECTION, SOLUTION INTRAVENOUS PRN
Status: DISCONTINUED | OUTPATIENT
Start: 2024-05-03 | End: 2024-05-03 | Stop reason: HOSPADM

## 2024-05-03 RX ORDER — SODIUM CHLORIDE, SODIUM LACTATE, POTASSIUM CHLORIDE, CALCIUM CHLORIDE 600; 310; 30; 20 MG/100ML; MG/100ML; MG/100ML; MG/100ML
INJECTION, SOLUTION INTRAVENOUS ONCE
Status: COMPLETED | OUTPATIENT
Start: 2024-05-03 | End: 2024-05-03

## 2024-05-03 RX ADMIN — LIDOCAINE HYDROCHLORIDE 5 ML: 20 INJECTION, SOLUTION INFILTRATION; PERINEURAL at 12:55

## 2024-05-03 RX ADMIN — PROPOFOL 180 MCG/KG/MIN: 10 INJECTION, EMULSION INTRAVENOUS at 12:56

## 2024-05-03 RX ADMIN — PROPOFOL 50 MG: 10 INJECTION, EMULSION INTRAVENOUS at 12:55

## 2024-05-03 RX ADMIN — SODIUM CHLORIDE, POTASSIUM CHLORIDE, SODIUM LACTATE AND CALCIUM CHLORIDE: 600; 310; 30; 20 INJECTION, SOLUTION INTRAVENOUS at 12:50

## 2024-05-03 RX ADMIN — SODIUM CHLORIDE, POTASSIUM CHLORIDE, SODIUM LACTATE AND CALCIUM CHLORIDE: 600; 310; 30; 20 INJECTION, SOLUTION INTRAVENOUS at 11:20

## 2024-05-03 NOTE — OP NOTE
Operative Note      Patient: Dragan Florez  YOB: 1970  MRN: [id number]  Ernesto Fregoso DO      Date of Procedure: 5/3/2024    Pre-Op Diagnosis Codes:     * Screen for colon cancer [Z12.11]    Post-Op Diagnosis:  mild cecal diverticulosis. Sessile sigmoid polyp along fold. Mild grade 1 internal hemorrhoids.        Procedure:    Colonoscopy to cecum with cold forceps polypectomy     Surgeon(s):  Blanca Vicente DO    Assistant:   * No surgical staff found *    Anesthesia: Monitor Anesthesia Care    Estimated Blood Loss (mL): n/a    Complications: None    Specimens:   ID Type Source Tests Collected by Time Destination   A : sigmoid colon polyp Tissue Sigmoid Colon SURGICAL PATHOLOGY Blanca Vicente DO 5/3/2024 1312        Implants:  * No implants in log *      Drains: * No LDAs found *    Findings:  Infection Present At Time Of Surgery (PATOS) (choose all levels that have infection present):  No infection present  Other Findings:       Procedure details:    I do meet with the patient in preoperative holding area. Please see H&P for indications for the above named procedure. Patient was brought to the endoscopy suite and placed under monitored anesthesia. Patient was positioned in left lateral position. Time out called and procedure confirmed. Rectal examination performed. The lubricated variable stiffness pediatric colonoscope was carefully passed under direct vision into the rectum, advanced through the sigmoid colon, transverse colon, ascending colon and to the cecum.    Findings:     Cecum: normal cecal pouch. IC valve and appendiceal orifice well confirmed. Mild cecal diverticulosis without inflammation  Ascending: normal  Transverse: normal  Descending: normal  Sigmoid: sessile polypectomy cold forceps performed, polyp along fold, confirmed on NBI  Rectum: negative  Rectal exam: negative  Bowel prep quality: excellent     At the distal 1/3 of the rectum, the scope was retroflexed and shows mild  internal hemorrhoids no inflamation or thrombosis, grade 1. The patient tolerated the procedure well.  The abdomen was soft after the procedure. I spoke with pt/family afterwards.     Next colonoscopy for screenin years      Electronically signed by Blanca Vicente DO, FACOS, FACS on 5/3/2024 at 1:28 PM

## 2024-05-03 NOTE — H&P
GENERAL SURGERY CONSULTATION      Patient's Name/ Date of Birth/ Gender: Dragan Florez / 1970 (53 y.o.) / male     PCP: Ernesto Fregoso DO  Referring:     History of present Illness:  Patient is a pleasant 53 y.o. male  kindly referred by Ernesto Fregoso DO    Past Medical History:  has a past medical history of Anxiety, GERD (gastroesophageal reflux disease), History of echocardiogram, History of Holter monitoring, History of Holter monitoring, and Hypertension.    Past Surgical History:   Past Surgical History:   Procedure Laterality Date    ANUS SURGERY      COLONOSCOPY N/A 4/15/2019    -polyp(tubular adenoma)hermorrhoids    TONSILLECTOMY         Social History:  reports that he has never smoked. He has never used smokeless tobacco. He reports current alcohol use of about 12.0 standard drinks of alcohol per week. He reports current drug use. Drug: Marijuana (Weed).    Family History: family history includes Other in his brother; Stroke (age of onset: 62) in his father.    Review of Systems:   General: Completed and, except as mentioned above, was negative or noncontributory  Psychological:  Completed and, except as mentioned above, was negative or noncontributory  Ophthalmic:  Completed and, except as mentioned above, was negative or noncontributory  ENT:  Completed and, except as mentioned above, was negative or noncontributory  Allergy and Immunology:  Completed and, except as mentioned above, was negative or noncontributory  Hematological and Lymphatic:  Completed and, except as mentioned above, was negative or noncontributory  Endocrine: Completed and, except as mentioned above, was negative or noncontributory  Breast:  Completed and, except as mentioned above, was negative or noncontributory  Respiratory:  Completed and, except as mentioned above, was negative or noncontributory  Cardiovascular:  Completed and, except as mentioned above, was negative or noncontributory  Gastrointestinal:

## 2024-05-03 NOTE — PROGRESS NOTES
Discharge Criteria    Inpatients must meet Criteria 1 through 7. All other patients are either YES or N/A. If a NO is chosen then Anesthesia or Surgeon must be notified.      1.  Minimum 30 minutes after last dose of sedative medication.    Yes      2.  Systolic BP between 90 - 160. Diastolic BP between 60 - 90.    Yes      3.  Pulse between 60 - 120    Yes      4.  Respirations between 8 - 25.    Yes      5.  SpO2 92% - 100%.    Yes      6.  Able to cough and swallow or return to baseline function.    Yes      7.  Alert and oriented or return to baseline mental status.    Yes      8.  Demonstrates controlled, coordinated movements, ambulates with steady gait, or return to baseline activity function.    Yes      9.  Minimal or no pain or nausea, or at a level tolerable and acceptable to patient.    Yes      10. Takes and retains oral fluids as allowed.    N/A pt declined fluids      11. Procedural / perioperative site stable.  Minimal or no bleeding.    Yes          12. If GI endoscopy procedure, minimal or no abdominal distention or passing flatus.    Yes      13. Written discharge instructions and emergency telephone number provided.    Yes      14. Accompanied by a responsible adult.    Yes

## 2024-05-03 NOTE — PROGRESS NOTES
Discharge instructions reviewed with pt and Anni wife. All questions answered. Pt verbalizes readiness to go home.

## 2024-05-03 NOTE — ANESTHESIA PRE PROCEDURE
Department of Anesthesiology  Preprocedure Note       Name:  Dragan Florez   Age:  53 y.o.  :  1970                                          MRN:  [id number]         Date:  5/3/2024      Surgeon: Surgeon(s):  Blanca Vicente DO    Procedure: Procedure(s):  COLORECTAL CANCER SCREENING, NOT HIGH RISK    Medications prior to admission:   Prior to Admission medications    Medication Sig Start Date End Date Taking? Authorizing Provider   ATORVASTATIN CALCIUM PO Take by mouth daily   Yes Vannessa Grimm MD   metoprolol succinate (TOPROL XL) 25 MG extended release tablet Take 1 tablet by mouth daily    Vannessa Grimm MD   diltiazem (CARDIZEM CD) 180 MG extended release capsule Take 1 capsule by mouth daily 3/13/19   Joon Holley MD   ALPRAZolam (XANAX) 0.25 MG tablet Take 1 tablet by mouth as needed. 18   Vannessa Grimm MD   omeprazole (PRILOSEC) 20 MG delayed release capsule Take 1 capsule by mouth every other day 3/16/18   Vannessa Grimm MD   aspirin 81 MG tablet Take 1 tablet by mouth daily    Vannessa Grimm MD   Psyllium (METAMUCIL PO) Take by mouth    Vannessa Grimm MD       Current medications:    No current facility-administered medications for this encounter.       Allergies:  No Known Allergies    Problem List:    Patient Active Problem List   Diagnosis Code   • Frequent PVCs I49.3   • Essential hypertension I10       Past Medical History:        Diagnosis Date   • Anxiety    • GERD (gastroesophageal reflux disease)    • History of echocardiogram 2018    Frequent PVC's throughout the procedure. Unremarkable study.   • History of Holter monitoring 2018    Very frequent PVC's comprsing 9.7% of total duration. Tachycardia for 30% of the total test duration. Symproms associated with PVC's and sinus tachycardia.    • History of Holter monitoring 2018    Sinus rhythm w/ frequent PVC's (PVC burdern 2%), mainly isolated w/ multiple couplets. No

## 2024-05-03 NOTE — ANESTHESIA POSTPROCEDURE EVALUATION
Department of Anesthesiology  Postprocedure Note    Patient: Dragan Florez  MRN: [id number]  YOB: 1970  Date of evaluation: 5/3/2024    Procedure Summary       Date: 05/03/24 Room / Location: [address]    Anesthesia Start: 1252 Anesthesia Stop: 1322    Procedure: COLONOSCOPY POLYPECTOMY COLD BIOPSY Diagnosis:       Screen for colon cancer      (Screen for colon cancer [Z12.11])    Surgeons: Blanca Vicente DO Responsible Provider: Myriam Cao APRN - CRNA    Anesthesia Type: general ASA Status: 2            Anesthesia Type: No value filed.    Tuan Phase I: Tuan Score: 10    Tuan Phase II: Tuan Score: 10    Anesthesia Post Evaluation    Patient location during evaluation: PACU  Patient participation: complete - patient participated  Level of consciousness: awake and alert  Pain score: 0  Airway patency: patent  Nausea & Vomiting: no vomiting and no nausea  Cardiovascular status: blood pressure returned to baseline  Respiratory status: acceptable  Hydration status: stable    No notable events documented.

## 2024-05-07 LAB — SURGICAL PATHOLOGY REPORT: NORMAL

## (undated) DEVICE — CANNULA ORAL NSL AD CO2 N INTUB O2 DEL DISP TRU LNK

## (undated) DEVICE — FORCEPS BX L240CM JAW DIA2.8MM L CAP W/ NDL MIC MESH TOOTH

## (undated) DEVICE — TUBING SUCT NON-STRL 9/32X100 W/CNNT

## (undated) DEVICE — SOLUTION IRRIG 1000ML 0.9% SOD CHL USP POUR PLAS BTL

## (undated) DEVICE — SNARE EXACTO COLD

## (undated) DEVICE — TRAP SURG QUAD PARABOLA SLOT DSGN SFTY SCRN TRAPEASE

## (undated) DEVICE — MEDI-VAC NON-CONDUCTIVE TUBING7MM X 30.5 (100FT): Brand: CARDINAL HEALTH

## (undated) DEVICE — SOLUTION IV IRRIG POUR BRL 0.9% SODIUM CHL 2F7124